# Patient Record
Sex: FEMALE | Race: WHITE | NOT HISPANIC OR LATINO | URBAN - METROPOLITAN AREA
[De-identification: names, ages, dates, MRNs, and addresses within clinical notes are randomized per-mention and may not be internally consistent; named-entity substitution may affect disease eponyms.]

---

## 2020-05-06 ENCOUNTER — EMERGENCY (EMERGENCY)
Facility: HOSPITAL | Age: 35
LOS: 1 days | Discharge: HOME | End: 2020-05-06
Attending: EMERGENCY MEDICINE | Admitting: EMERGENCY MEDICINE
Payer: COMMERCIAL

## 2020-05-06 VITALS
TEMPERATURE: 98 F | SYSTOLIC BLOOD PRESSURE: 145 MMHG | OXYGEN SATURATION: 100 % | DIASTOLIC BLOOD PRESSURE: 91 MMHG | RESPIRATION RATE: 20 BRPM | WEIGHT: 255.07 LBS | HEIGHT: 68 IN | HEART RATE: 79 BPM

## 2020-05-06 VITALS
OXYGEN SATURATION: 100 % | HEART RATE: 74 BPM | DIASTOLIC BLOOD PRESSURE: 87 MMHG | TEMPERATURE: 98 F | SYSTOLIC BLOOD PRESSURE: 134 MMHG | RESPIRATION RATE: 19 BRPM

## 2020-05-06 PROCEDURE — 70450 CT HEAD/BRAIN W/O DYE: CPT

## 2020-05-06 PROCEDURE — 72125 CT NECK SPINE W/O DYE: CPT

## 2020-05-06 PROCEDURE — 70450 CT HEAD/BRAIN W/O DYE: CPT | Mod: 26

## 2020-05-06 PROCEDURE — 99284 EMERGENCY DEPT VISIT MOD MDM: CPT

## 2020-05-06 PROCEDURE — 99284 EMERGENCY DEPT VISIT MOD MDM: CPT | Mod: 25

## 2020-05-06 PROCEDURE — 72125 CT NECK SPINE W/O DYE: CPT | Mod: 26
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
## 2020-05-06 NOTE — ED ADULT NURSE NOTE - NSIMPLEMENTINTERV_GEN_ALL_ED
Implemented All Universal Safety Interventions:  [place] to call system. Call bell, personal items and telephone within reach. Instruct patient to call for assistance. Room bathroom lighting operational. Non-slip footwear when patient is off stretcher. Physically safe environment: no spills, clutter or unnecessary equipment. Stretcher in lowest position, wheels locked, appropriate side rails in place.
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
## 2020-05-06 NOTE — ED PROVIDER NOTE - PATIENT PORTAL LINK FT
You can access the FollowMyHealth Patient Portal offered by [facility] by registering at the following website: [email or web address]. By joining [employer]’s FollowMyHealth portal, you will also be able to view your health information using other applications (apps) compatible with our system.
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
## 2020-05-06 NOTE — ED PROVIDER NOTE - CONSTITUTIONAL, MLM
normal... Well appearing, white female, awake, alert, oriented to person, place, time/situation and in no apparent distress with hard cervical collar in place.

## 2020-05-06 NOTE — ED PROVIDER NOTE - MUSCULOSKELETAL, MLM
Spine appears normal, range of motion is not limited, no muscle or joint tenderness. Chest wall non tender.

## 2020-05-07 DIAGNOSIS — S16.1XXA STRAIN OF MUSCLE, FASCIA AND TENDON AT NECK LEVEL, INITIAL ENCOUNTER: ICD-10-CM

## 2020-05-07 DIAGNOSIS — R51 HEADACHE: ICD-10-CM

## 2020-05-07 DIAGNOSIS — Z88.2 ALLERGY STATUS TO SULFONAMIDES: ICD-10-CM
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
## 2020-05-29 PROBLEM — Z00.00 ENCOUNTER FOR PREVENTIVE HEALTH EXAMINATION: Status: ACTIVE | Noted: 2020-05-29

## 2020-09-28 PROBLEM — Z78.9 OTHER SPECIFIED HEALTH STATUS: Chronic | Status: ACTIVE | Noted: 2020-05-06

## 2020-10-13 ENCOUNTER — APPOINTMENT (OUTPATIENT)
Dept: OTOLARYNGOLOGY | Facility: CLINIC | Age: 35
End: 2020-10-13
Payer: COMMERCIAL

## 2020-10-13 VITALS
HEART RATE: 79 BPM | SYSTOLIC BLOOD PRESSURE: 134 MMHG | DIASTOLIC BLOOD PRESSURE: 86 MMHG | OXYGEN SATURATION: 100 % | TEMPERATURE: 98.2 F

## 2020-10-13 VITALS
WEIGHT: 255 LBS | BODY MASS INDEX: 38.65 KG/M2 | DIASTOLIC BLOOD PRESSURE: 64 MMHG | HEIGHT: 68 IN | SYSTOLIC BLOOD PRESSURE: 138 MMHG | OXYGEN SATURATION: 100 % | HEART RATE: 80 BPM | TEMPERATURE: 98.2 F

## 2020-10-13 DIAGNOSIS — D44.0 NEOPLASM OF UNCERTAIN BEHAVIOR OF THYROID GLAND: ICD-10-CM

## 2020-10-13 DIAGNOSIS — E04.2 NONTOXIC MULTINODULAR GOITER: ICD-10-CM

## 2020-10-13 PROCEDURE — 76536 US EXAM OF HEAD AND NECK: CPT

## 2020-10-13 PROCEDURE — 99205 OFFICE O/P NEW HI 60 MIN: CPT | Mod: 25

## 2020-10-13 PROCEDURE — 31575 DIAGNOSTIC LARYNGOSCOPY: CPT

## 2020-10-13 NOTE — CONSULT LETTER
[Dear  ___] : Dear  [unfilled], [Consult Letter:] : I had the pleasure of evaluating your patient, [unfilled]. [Please see my note below.] : Please see my note below. [Consult Closing:] : Thank you very much for allowing me to participate in the care of this patient.  If you have any questions, please do not hesitate to contact me. [Sincerely,] : Sincerely, [DrIbis  ___] : Dr. GRISSOM [FreeTextEntry3] : \par Dony Nelson M.D., FACS, ECNU\par Director [facility] Thyroid [facility] Parathyroid Surgery\par [facility]\par Certified in Thyroid/Parathyroid/Neck Ultrasound, ECNU/ AIUM\par \par , Department of Otolaryngology\par [facility] [employer] [facility]\par

## 2020-10-13 NOTE — PROCEDURE
[Image(s) Captured] : image(s) captured and filed [Unable to Cooperate with Mirror] : patient unable to cooperate with mirror [Gag Reflex] : gag reflex preventing mirror examination [Topical Lidocaine] : topical lidocaine [Oxymetazoline HCl] : oxymetazoline HCl [Flexible Endoscope] : examined with the flexible endoscope [Serial Number: ___] : Serial Number: [unfilled] [FreeTextEntry3] : \par [facility]\par THYROID/NECK ULTRASOUND REPORT\par \par NAME: EVANGELINA WAGGONER .Ibis...           MR# [id number].....	              : 1985.....	         DATE: 10/13/2020\par \par HISTORY/ INDICATIONS: A 35-year-old female with possible thyromegaly identified during a cervical spine fusion by her orthopedic surgeon and difficult neck exam due to anterior adiposity and edema from recent surgery.  \par \par COMPARISON: None\par \par PROCEDURE: Physician performed high-resolution ultrasound gray scale imaging and color Doppler supplementation of the thyroid gland and neck was obtained in the longitudinal and transverse planes using a 13 MHz linear transducer with image capture.  All measurements are in centimeters (longitudinal x AP x transverse).  \par \par FINDINGS: Overall the thyroid gland is borderline normal in size,  heterogeneous in echotexture with normal vascularity on color Doppler flow. There are several tiny bilateral colloid nodules with a completely benign appearance and no enlarged or abnormal appearing cervical lymph nodes. \par \par RIGHT LOBE: Is minimally enlarged, heterogeneous, with normal vascularity on color Doppler and measures 4.79 x 1.23 x 1.99 cm.  NODULES: A medial hypoechoic nodule just abutting the isthmus is identified and is hypoechoic with a central echogenic punctate focus, smoothly marginated, wider than tall with grade 1 vascularity that measures 0.53 x 0.32 x 0.41 cm. \par \par ISTHMUS: Measures 0.20 cm in AP dimension and is homogeneous in echotexture with normal vascularity.  No nodules are identified.\par \par LEFT LOBE: Is not enlarged, heterogeneous, with normal vascularity on color Doppler and measures 4.60 x 1.45 x 1.77 cm. NODULES: A left mid-lower colloid nodule was identified having a central punctate echogenic focus, smooth margins, grade 1 vascularity and wider than tall that measures 0.25 0.13 x 0.27 cm.\par \par PARATHYROID GLANDS: There are no identified enlarged parathyroid glands in the central neck compartment. \par \par LYMPH NODES: There are several benign appearing subcentimeter lymph nodes identified at neck levels II- III bilaterally (lateral neck), all with echogenic hilar lines, no calcifications or cystic degeneration and have a short long axis ratio < 0.5 in the transverse plane.  There are no enlarged or abnormal appearing central compartment, level VI lymph nodes.\par \par IMPRESSION: A 35-year-old female with a borderline enlarged thyroid gland with bilateral benign-appearing tiny colloid nodules, and no abnormal appearing cervical lymph nodes.\par \par RECOMMENDATIONS: Repeat thyroid ultrasound in 1 year only if there is palpable new disease.\par \par Electronically signed by Dony Nelson MD on 10/13/2020, 2:20 PM\par \par [facility]: [address] [place] [address], [address] [place] [address]\par [phone] (voice),  [phone] (fax) [de-identified] : The nasal septum is minimally deviated to the right. There are no masses or polyps and the nasal mucosa and secretions are normal. The choanae and posterior nasopharynx are normal without masses or drainage. The Eustachian tube orifices appear patent. The pharynx, including the posterior and lateral pharyngeal walls, the vallecula and base of tongue are normal without ulcerations, lesions or masses. The hypopharynx including the pyriform sinuses open well without pooling of secretions, mucosal lesions or masses. The supraglottic larynx including the epiglottis, petiole, arytenoids, glossoepiglottic, aryepiglottic and pharyngoepiglottic folds are normal without mucosal lesions, ulcerations or masses. The glottis reveals normal false vocal folds. The true vocal folds are glistening white, tense and of equal length, without paralysis, having symmetric mobility on adduction and abduction. There are no mucosal lesions, nodules, cysts, erythroplasia or leukoplakia. The posterior cricoid area has healthy pink mucosa in the interarytenoid area and esophageal inlet. There is mild  thickening and tiger striping of the interarytenoid mucosa suggestive of posterior laryngitis from laryngopharyngeal acid reflux disease. The trachea is clear without narrowing in the immediate subglottic region, without deviation or lesions. \par  [de-identified] : goiter, GERD
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
## 2021-04-21 ENCOUNTER — APPOINTMENT (OUTPATIENT)
Dept: PULMONOLOGY | Facility: CLINIC | Age: 36
End: 2021-04-21
Payer: COMMERCIAL

## 2021-04-21 VITALS
HEART RATE: 67 BPM | BODY MASS INDEX: 40.16 KG/M2 | WEIGHT: 265 LBS | DIASTOLIC BLOOD PRESSURE: 87 MMHG | HEIGHT: 68 IN | RESPIRATION RATE: 16 BRPM | OXYGEN SATURATION: 98 % | TEMPERATURE: 98.2 F | SYSTOLIC BLOOD PRESSURE: 116 MMHG

## 2021-04-21 DIAGNOSIS — F51.04 PSYCHOPHYSIOLOGIC INSOMNIA: ICD-10-CM

## 2021-04-21 PROCEDURE — 99072 ADDL SUPL MATRL&STAF TM PHE: CPT

## 2021-04-21 PROCEDURE — 99205 OFFICE O/P NEW HI 60 MIN: CPT

## 2021-04-21 NOTE — REVIEW OF SYSTEMS
[Negative] : Psychiatric [EDS: ESS=____] : daytime somnolence: ESS=[unfilled] [Recent Wt Gain (___ Lbs)] : recent [unfilled] ~Ulb weight gain [Snoring] : snoring [Difficulty Maintaining Sleep] : difficulty maintaining sleep [Chronic Insomnia] : chronic  insomnia

## 2021-04-21 NOTE — HISTORY OF PRESENT ILLNESS
[FreeTextEntry1] : 36 y/o F with no significant PMH presented to the sleep clinic for an initial evaluation.\par \par Her main complaint is insomnia.  Her symptoms started one year prior during the pandemic.  She worked as an  at the time and is now on disability.  In that year she also underwent cervical/lumbar fusion surgeries as well.  She notes that she had an HSAT on 05/2020 which showed an LAURO of 2.0/hr.  She notes that she is now going to bed at 8-8:30 pm and has multiple awakenings during the night.  She finds that she is on her phone/computer while in bed.  Wakes up at 7-7:3 am and feels unrefreshed.  Has tried melatonin and Tylenol PM in the past with limited improvement in her symptoms.\par \par She recently underwent lumbar laminectomy and fusion on 04/2021 and is on Percocet and Flexeril.  \par \par ____________________________________________________________________\par EPWORTH SLEEPINESS SCALE\par \par How likely are you to doze off or fall asleep in the situations described below, in contrast to feeling just tired?  \par This refers to your usual way of life in recent times.  \par Even if you haven't done some of these things recently, try to work out how they would have affected you.\par Use the following scale to choose one most appropriate number for each situation.\par \par Chance of dozing.............Situation\par ...........2.............................Sitting and reading\par ...........3.............................Watching TV\par ...........2.............................Sitting inactive in a public place (eg a theatre or a meeting)\par ...........1.............................As a passenger in a car for an hour without a break\par ...........3.............................Lying down to rest in the afternoon when circumstances permit\par ...........0.............................Sitting and talking to someone\par ...........1.............................Sitting quietly after lunch without alcohol\par ...........0.............................In a car, while stopped for a few minutes in traffic\par \par ..........12..............................TOTAL SCORE\par \par 0 = Never would doze\par 1 = Slight chance of dozing\par 2 = Moderate chance of dozing\par 3 = High chance of dozing \par ______________________________________________________________________  [Snoring] : snoring [Witnessed Apneas] : no witnessed sleep apnea [Frequent Nocturnal Awakening] : frequent nocturnal awakening [Daytime Somnolence] : daytime somnolence [DMS] : DMS [ESS] : 12

## 2021-04-21 NOTE — ASSESSMENT
[FreeTextEntry1] : 36 y/o F with no significant PMH presented to the sleep clinic for an initial evaluation. Her main complaint is chronic insomnia.  Her symptoms started one year prior during the pandemic.  She worked as an  for [employer] at the time and is now on disability.  In that year she also underwent cervical/lumbar fusion surgeries as well.  She notes that she had an HSAT on 05/2020 which showed an LAURO of 2.0/hr.  On her most recently sleep schedule she is now going to bed at 8-8:30 pm and has multiple awakenings during the night. Denies any RLS symptoms and her dog does not sleep with her.  She finds that she is on her computer while in bed.  Wakes up at 7-7:3 am and feels unrefreshed.  Has tried melatonin and Tylenol PM in the past with limited improvement in her symptoms. In the past, she would prefer to go to sleep at 12-1 am and wake up at 8-9 am (more of a delayed sleep schedule).  Stimulus control, sleep restriction, and sleep hygiene measures were reviewed with the patient.  Sleep diaries was provided to the patient.  She was advised to anchor a daily fixed sherita up time at 7:00 am (patient's preferred wake up time) with reporting to bed at 1:00 am on Week 1. On Week 2, she will report to bed 30 - minutes earlier (to 12:30 am) and on Week 3 at 12:00 am to regularize, and improve sleep quality and duration.  She was advised to start REMFRESH 2 mg as the extended release Melatonin can help with his maintenance insomnia. Patient was advised to avoid spending large amounts of time in bed awake, to get into bed only when sleepy in order to increase sleep efficiency and to not use electronic devices (I-PAD, computer, phone) while in bed when he cannot sleep. The role of this intervention in strengthening the connection between the bed, bedroom environment and the ability to fall asleep was explained to the patient. \par \par \par Petey Langley, DO\par Pulmonary, Critical Care and Sleep Medicine Attending

## 2024-05-10 NOTE — ED ADULT NURSE NOTE - SKIN CAPILLARY REFILL
Patient left message asking about her pathology report. Report is not back yet. I called patient back and informed her that report is not back yet but that Dr. Smith will call her with results once received. She was appreciative of call.   
2 seconds or less

## 2025-04-07 NOTE — ED PROVIDER NOTE - PRINCIPAL DIAGNOSIS
Clinician's response relayed via portal message.     Closing encounter.   Acute strain of neck muscle, initial encounter